# Patient Record
Sex: FEMALE | Race: WHITE | NOT HISPANIC OR LATINO
[De-identification: names, ages, dates, MRNs, and addresses within clinical notes are randomized per-mention and may not be internally consistent; named-entity substitution may affect disease eponyms.]

---

## 2022-09-22 ENCOUNTER — NON-APPOINTMENT (OUTPATIENT)
Age: 37
End: 2022-09-22

## 2022-09-22 PROBLEM — Z00.00 ENCOUNTER FOR PREVENTIVE HEALTH EXAMINATION: Status: ACTIVE | Noted: 2022-09-22

## 2022-09-23 ENCOUNTER — OUTPATIENT (OUTPATIENT)
Dept: OUTPATIENT SERVICES | Facility: HOSPITAL | Age: 37
LOS: 1 days | End: 2022-09-23
Payer: MEDICAID

## 2022-09-23 ENCOUNTER — APPOINTMENT (OUTPATIENT)
Dept: CT IMAGING | Facility: CLINIC | Age: 37
End: 2022-09-23

## 2022-09-23 ENCOUNTER — TRANSCRIPTION ENCOUNTER (OUTPATIENT)
Age: 37
End: 2022-09-23

## 2022-09-23 ENCOUNTER — NON-APPOINTMENT (OUTPATIENT)
Age: 37
End: 2022-09-23

## 2022-09-23 ENCOUNTER — APPOINTMENT (OUTPATIENT)
Dept: ORTHOPEDIC SURGERY | Facility: CLINIC | Age: 37
End: 2022-09-23

## 2022-09-23 VITALS
HEIGHT: 62 IN | DIASTOLIC BLOOD PRESSURE: 68 MMHG | WEIGHT: 293 LBS | BODY MASS INDEX: 53.92 KG/M2 | HEART RATE: 174 BPM | SYSTOLIC BLOOD PRESSURE: 106 MMHG

## 2022-09-23 DIAGNOSIS — Z01.818 ENCOUNTER FOR OTHER PREPROCEDURAL EXAMINATION: ICD-10-CM

## 2022-09-23 DIAGNOSIS — S82.851A DISPLACED TRIMALLEOLAR FRACTURE OF RIGHT LOWER LEG, INITIAL ENCOUNTER FOR CLOSED FRACTURE: ICD-10-CM

## 2022-09-23 PROCEDURE — 73700 CT LOWER EXTREMITY W/O DYE: CPT | Mod: 26,RT

## 2022-09-23 PROCEDURE — 73700 CT LOWER EXTREMITY W/O DYE: CPT

## 2022-09-23 PROCEDURE — 99204 OFFICE O/P NEW MOD 45 MIN: CPT

## 2022-09-23 RX ORDER — OXYCODONE 5 MG/1
5 TABLET ORAL
Qty: 40 | Refills: 0 | Status: ACTIVE | COMMUNITY
Start: 2022-09-23 | End: 1900-01-01

## 2022-09-23 NOTE — ADDENDUM
[FreeTextEntry1] : I, Gris Parker, acted solely as a scribe for Dr. William Babin on this date 09/23/2022.\par \par All medical record entries made by the Scribe were at my, Dr. William Babin, direction and personally dictated by me on 09/23/2022. I have reviewed the chart and agree that the record accurately reflects my personal performance of the history, physical exam, assessment and plan. I have also personally directed, reviewed, and agreed with the chart.	\par

## 2022-09-23 NOTE — PHYSICAL EXAM
[de-identified] : General: Alert and oriented x3. In no acute distress. Pleasant in nature with a normal affect. No apparent respiratory distress.\par \par Right Ankle Exam: The patient was in a protective splint for the examination. The splint was not removed however a skin check was performed in office. \par Skin: Clean, dry, intact\par Inspection: No obvious malalignment, + swelling; no lymphadenopathy\par Pulses: 2+ DP/PT pulses\par ROM: Untested due to fracture. \par Neuro: In tact to light touch throughout [de-identified] : [facility] and reviewed in the office today, 09/23/2022 , revealed: Trimalleolar fracture of the right ankle.

## 2022-09-23 NOTE — DISCUSSION/SUMMARY
[de-identified] : Today I had a lengthy discussion with the patient regarding their right ankle, trimalleolar fracture. I have addressed all the patient's concerns surrounding the pathology of their condition. XR imaging results were reviewed with the patient. At this time I would like to obtain advanced imaging of the patient's right ankle. A CT scan was ordered so I can find out more about the etiology of the patient's condition. The patient should follow up with the office after obtaining the CT scan.		\par \par A lengthy discussion was had about the surgery. All risks, benefits and alternatives to the recommended surgical procedure were discussed which include but are not limited to bleeding, infection, nerve damage, vascular damage, failure of the wound to heal, the need for further surgery, loss of limb, DVT, PE, loss of life as well as the risks associated with general anesthesia.The patient verbalized understanding and provided informed consent to move forward with surgery. \par \par In the interim, the patient will continue to remain nonweightbearing with the posterior splint and crutches. I advised the patient to utilize 325 mg of Aspirin twice daily as instructed for blood thinning purposes. A prescription was also provided for pain medications. They can also elevate their RLE above the level of the heart. The patient understood and verbally agreed to the treatment plan. All of their questions were answered and they were satisfied with the visit. The patient should call the office if they have any questions or experience worsening symptoms.

## 2022-09-23 NOTE — HISTORY OF PRESENT ILLNESS
[FreeTextEntry1] : The patient is a 38 yo female who presents with a trimalleolar right ankle fracture which she sustained after she fell in her apartment and twisted her right ankle on 9/21/22.  She went to [facility] ED where they took x-rays diagnosed her with the fracture, they set the fracture and discharged her in a splint, NWB.  Patient on aspirin 81 mg prescribed by hospital for DVT prophylaxis.  Pain scale 5/10.  She is taking Motrin 600 mg. Here to discuss possible surgery for her right ankle.  The patient is accompanied by their father. She is currently taking Prozac, Wellbutrin, Adderall, and birth control with estrogen. No other complaints.

## 2022-09-26 ENCOUNTER — RESULT CHARGE (OUTPATIENT)
Age: 37
End: 2022-09-26

## 2022-09-26 RX ORDER — OXYCODONE 5 MG/1
5 TABLET ORAL
Qty: 20 | Refills: 0 | Status: ACTIVE | COMMUNITY
Start: 2022-09-26 | End: 1900-01-01

## 2022-09-27 ENCOUNTER — NON-APPOINTMENT (OUTPATIENT)
Age: 37
End: 2022-09-27

## 2022-09-27 ENCOUNTER — APPOINTMENT (OUTPATIENT)
Dept: INTERNAL MEDICINE | Facility: CLINIC | Age: 37
End: 2022-09-27

## 2022-09-27 VITALS
DIASTOLIC BLOOD PRESSURE: 70 MMHG | BODY MASS INDEX: 53.92 KG/M2 | SYSTOLIC BLOOD PRESSURE: 124 MMHG | TEMPERATURE: 97 F | HEIGHT: 62 IN | RESPIRATION RATE: 16 BRPM | HEART RATE: 98 BPM | OXYGEN SATURATION: 98 % | WEIGHT: 293 LBS

## 2022-09-27 DIAGNOSIS — Z99.89 OBSTRUCTIVE SLEEP APNEA (ADULT) (PEDIATRIC): ICD-10-CM

## 2022-09-27 DIAGNOSIS — F32.A ANXIETY DISORDER, UNSPECIFIED: ICD-10-CM

## 2022-09-27 DIAGNOSIS — J45.909 UNSPECIFIED ASTHMA, UNCOMPLICATED: ICD-10-CM

## 2022-09-27 DIAGNOSIS — F41.9 ANXIETY DISORDER, UNSPECIFIED: ICD-10-CM

## 2022-09-27 DIAGNOSIS — G47.33 OBSTRUCTIVE SLEEP APNEA (ADULT) (PEDIATRIC): ICD-10-CM

## 2022-09-27 DIAGNOSIS — F90.9 ATTENTION-DEFICIT HYPERACTIVITY DISORDER, UNSPECIFIED TYPE: ICD-10-CM

## 2022-09-27 DIAGNOSIS — Z01.818 ENCOUNTER FOR OTHER PREPROCEDURAL EXAMINATION: ICD-10-CM

## 2022-09-27 DIAGNOSIS — K59.00 CONSTIPATION, UNSPECIFIED: ICD-10-CM

## 2022-09-27 LAB
ALBUMIN SERPL ELPH-MCNC: 3.3 G/DL
ALP BLD-CCNC: 69 U/L
ALT SERPL-CCNC: 34 U/L
ANION GAP SERPL CALC-SCNC: 12 MMOL/L
APTT BLD: 29.1 SEC
AST SERPL-CCNC: 16 U/L
BASOPHILS # BLD AUTO: 0.09 K/UL
BASOPHILS NFR BLD AUTO: 1.3 %
BILIRUB SERPL-MCNC: 0.2 MG/DL
BUN SERPL-MCNC: 12 MG/DL
CALCIUM SERPL-MCNC: 8.8 MG/DL
CHLORIDE SERPL-SCNC: 105 MMOL/L
CO2 SERPL-SCNC: 24 MMOL/L
CREAT SERPL-MCNC: 0.96 MG/DL
EGFR: 79 ML/MIN/1.73M2
EOSINOPHIL # BLD AUTO: 0.72 K/UL
EOSINOPHIL NFR BLD AUTO: 10.5 %
GLUCOSE SERPL-MCNC: 89 MG/DL
HCT VFR BLD CALC: 39.8 %
HGB BLD-MCNC: 12.7 G/DL
IMM GRANULOCYTES NFR BLD AUTO: 0.1 %
INR PPP: 0.94 RATIO
LYMPHOCYTES # BLD AUTO: 2.34 K/UL
LYMPHOCYTES NFR BLD AUTO: 34.3 %
MAN DIFF?: NORMAL
MCHC RBC-ENTMCNC: 27.9 PG
MCHC RBC-ENTMCNC: 31.9 GM/DL
MCV RBC AUTO: 87.5 FL
MONOCYTES # BLD AUTO: 0.49 K/UL
MONOCYTES NFR BLD AUTO: 7.2 %
NEUTROPHILS # BLD AUTO: 3.18 K/UL
NEUTROPHILS NFR BLD AUTO: 46.6 %
PLATELET # BLD AUTO: 249 K/UL
POTASSIUM SERPL-SCNC: 4.3 MMOL/L
PROT SERPL-MCNC: 6.6 G/DL
PT BLD: 11.1 SEC
RBC # BLD: 4.55 M/UL
RBC # FLD: 14.2 %
SARS-COV-2 N GENE NPH QL NAA+PROBE: NOT DETECTED
SODIUM SERPL-SCNC: 141 MMOL/L
WBC # FLD AUTO: 6.83 K/UL

## 2022-09-27 PROCEDURE — 93000 ELECTROCARDIOGRAM COMPLETE: CPT

## 2022-09-27 PROCEDURE — 99214 OFFICE O/P EST MOD 30 MIN: CPT | Mod: 25

## 2022-09-27 RX ORDER — BUPROPION HYDROCHLORIDE 150 MG/1
150 TABLET, EXTENDED RELEASE ORAL
Qty: 30 | Refills: 0 | Status: ACTIVE | COMMUNITY
Start: 2022-09-01

## 2022-09-27 RX ORDER — ALBUTEROL SULFATE 90 UG/1
108 (90 BASE) INHALANT RESPIRATORY (INHALATION)
Qty: 18 | Refills: 0 | Status: ACTIVE | COMMUNITY
Start: 2022-07-11

## 2022-09-27 RX ORDER — ASPIRIN 325 MG/1
325 TABLET ORAL
Refills: 0 | Status: ACTIVE | COMMUNITY
Start: 2022-09-27

## 2022-09-27 RX ORDER — IBUPROFEN 600 MG/1
600 TABLET, FILM COATED ORAL
Qty: 20 | Refills: 0 | Status: COMPLETED | COMMUNITY
Start: 2022-09-22

## 2022-09-27 RX ORDER — ALPRAZOLAM 0.5 MG/1
0.5 TABLET ORAL
Qty: 90 | Refills: 0 | Status: ACTIVE | COMMUNITY
Start: 2022-09-01

## 2022-09-27 RX ORDER — DESOGESTREL/ETHINYL ESTRADIOL AND ETHINYL ESTRADIOL 21-5 (28)
0.15-0.02/0.01 KIT ORAL
Qty: 84 | Refills: 0 | Status: ACTIVE | COMMUNITY
Start: 2022-07-11

## 2022-09-27 RX ORDER — FLUOXETINE HYDROCHLORIDE 40 MG/1
40 CAPSULE ORAL
Qty: 30 | Refills: 0 | Status: ACTIVE | COMMUNITY
Start: 2022-09-01

## 2022-09-27 RX ORDER — COVID-19 ANTIGEN TEST
KIT MISCELLANEOUS
Qty: 2 | Refills: 0 | Status: COMPLETED | COMMUNITY
Start: 2022-08-16

## 2022-09-27 RX ORDER — SODIUM FLUORIDE 1.1 G/100G
1.1 GEL, DENTIFRICE ORAL
Qty: 102 | Refills: 0 | Status: COMPLETED | COMMUNITY
Start: 2022-06-10

## 2022-09-27 RX ORDER — BECLOMETHASONE DIPROPIONATE HFA 40 UG/1
40 AEROSOL, METERED RESPIRATORY (INHALATION)
Qty: 11 | Refills: 0 | Status: ACTIVE | COMMUNITY
Start: 2022-07-11

## 2022-09-27 RX ORDER — DEXTROAMPHETAMINE SACCHARATE, AMPHETAMINE ASPARTATE, DEXTROAMPHETAMINE SULFATE AND AMPHETAMINE SULFATE 2.5; 2.5; 2.5; 2.5 MG/1; MG/1; MG/1; MG/1
10 TABLET ORAL
Qty: 45 | Refills: 0 | Status: ACTIVE | COMMUNITY
Start: 2022-09-21

## 2022-09-27 RX ORDER — DEXTROAMPHETAMINE SACCHARATE, AMPHETAMINE ASPARTATE MONOHYDRATE, DEXTROAMPHETAMINE SULFATE AND AMPHETAMINE SULFATE 7.5; 7.5; 7.5; 7.5 MG/1; MG/1; MG/1; MG/1
30 CAPSULE, EXTENDED RELEASE ORAL
Qty: 30 | Refills: 0 | Status: ACTIVE | COMMUNITY
Start: 2022-09-21

## 2022-09-27 NOTE — PHYSICAL EXAM
[Pedal Pulses Present] : the pedal pulses are present [No Edema] : there was no peripheral edema [Normal] : soft, non-tender, non-distended, no masses palpated, no HSM and normal bowel sounds [Alert and Oriented x3] : oriented to person, place, and time [de-identified] : rigtht foot in cast and ACE bandage

## 2022-09-27 NOTE — HISTORY OF PRESENT ILLNESS
[No Pertinent Cardiac History] : no history of aortic stenosis, atrial fibrillation, coronary artery disease, recent myocardial infarction, or implantable device/pacemaker [Asthma] : asthma [Sleep Apnea] : sleep apnea [No Adverse Anesthesia Reaction] : no adverse anesthesia reaction in self or family member [(Patient denies any chest pain, claudication, dyspnea on exertion, orthopnea, palpitations or syncope)] : Patient denies any chest pain, claudication, dyspnea on exertion, orthopnea, palpitations or syncope [Good (7-10 METs)] : Good (7-10 METs) [Anti-Platelet Agents: _____] : Anti-Platelet Agents: [unfilled] [COPD] : no COPD [Chronic Anticoagulation] : no chronic anticoagulation [Chronic Kidney Disease] : no chronic kidney disease [Diabetes] : no diabetes [FreeTextEntry1] : right ankle surgery  [FreeTextEntry2] : 9/28/22 [FreeTextEntry3] : Dr. Babin  [FreeTextEntry4] : Patient is a 37 year old female with PMH SHELBY on CPAP, anxiety, depression, ADHD, asthma who presents for medical clearance. Patient is going to be having right ankle surgery after she sustained an ankle fracture after a fall last week. Patient feels well today, Has been having some constipation but has been passing gas and eating and drinking normally. Otherwise denies any other acute complaints. \par \par

## 2022-09-27 NOTE — PLAN
[FreeTextEntry1] : Constipation: Likely due to recent oxy use, start Miralax BID, Senna, mag citrate ordered until patient has BM \par Asthma: continue inhalers \par SHELBY: continue CPAP \par ADHD: continue Adderall \par Anxiety and Depression: Continue Wellbutrin, Prozac.

## 2022-09-27 NOTE — ASSESSMENT
[Patient Optimized for Surgery] : Patient optimized for surgery [No Further Testing Recommended] : no further testing recommended [FreeTextEntry4] : Patient is a 37 year old female with PMH SHELBY on CPAP, anxiety, depression, ADHD, asthma who presents for medical clearance for right ankle surgery on 9/28/22 with Dr Babin at [facility]. Patient is able to perform > 4 METs without difficulty. She denies any symptoms of chest pain, palpitations, SOB, orthopnea, PND, LE edema. Her pre-op labs have been reviewed and are within normal limits, and her EKG is sinus rhythm with no ST or T wave abnormalities. She has been placed on  mg prior to surgery for DVT ppx which she will continue until the night prior to surgery, She will continue to use her CPAP machine nightly. Patient is intermediate risk for low risk procedure and has been medically optimized for planned procedure.

## 2022-09-28 ENCOUNTER — TRANSCRIPTION ENCOUNTER (OUTPATIENT)
Age: 37
End: 2022-09-28

## 2022-09-28 RX ORDER — OXYCODONE HYDROCHLORIDE 5 MG/1
5 TABLET ORAL ONCE
Refills: 0 | Status: DISCONTINUED | OUTPATIENT
Start: 2022-09-29 | End: 2022-09-29

## 2022-09-28 RX ORDER — ONDANSETRON 8 MG/1
4 TABLET, FILM COATED ORAL ONCE
Refills: 0 | Status: DISCONTINUED | OUTPATIENT
Start: 2022-09-29 | End: 2022-09-29

## 2022-09-28 RX ORDER — SODIUM CHLORIDE 9 MG/ML
1000 INJECTION, SOLUTION INTRAVENOUS
Refills: 0 | Status: DISCONTINUED | OUTPATIENT
Start: 2022-09-29 | End: 2022-09-29

## 2022-09-28 RX ORDER — FENTANYL CITRATE 50 UG/ML
50 INJECTION INTRAVENOUS
Refills: 0 | Status: DISCONTINUED | OUTPATIENT
Start: 2022-09-29 | End: 2022-09-29

## 2022-09-28 NOTE — ASU PATIENT PROFILE, ADULT - NSICDXPASTMEDICALHX_GEN_ALL_CORE_FT
PAST MEDICAL HISTORY:  ADHD     Anxiety     Asthma     Fracture, trimalleolar right    H/O sleep apnea     H/O: depression

## 2022-09-28 NOTE — ASU PATIENT PROFILE, ADULT - FALL HARM RISK - HARM RISK INTERVENTIONS

## 2022-09-28 NOTE — ASU PATIENT PROFILE, ADULT - PAIN SCALE PREFERRED, PROFILE
ARF with prerenal on steroids; pulmonary Hypertension; Anemia; Chest  tube. Bladder retention. numerical 0-10

## 2022-09-29 ENCOUNTER — TRANSCRIPTION ENCOUNTER (OUTPATIENT)
Age: 37
End: 2022-09-29

## 2022-09-29 ENCOUNTER — OUTPATIENT (OUTPATIENT)
Dept: INPATIENT UNIT | Facility: HOSPITAL | Age: 37
LOS: 1 days | Discharge: HOME | End: 2022-09-29
Payer: MEDICAID

## 2022-09-29 ENCOUNTER — APPOINTMENT (OUTPATIENT)
Dept: ORTHOPEDIC SURGERY | Facility: HOSPITAL | Age: 37
End: 2022-09-29

## 2022-09-29 VITALS
HEART RATE: 101 BPM | TEMPERATURE: 99 F | OXYGEN SATURATION: 100 % | SYSTOLIC BLOOD PRESSURE: 152 MMHG | HEIGHT: 62 IN | WEIGHT: 293 LBS | RESPIRATION RATE: 16 BRPM | DIASTOLIC BLOOD PRESSURE: 96 MMHG

## 2022-09-29 VITALS
HEART RATE: 100 BPM | SYSTOLIC BLOOD PRESSURE: 141 MMHG | DIASTOLIC BLOOD PRESSURE: 76 MMHG | RESPIRATION RATE: 16 BRPM | TEMPERATURE: 97 F | OXYGEN SATURATION: 99 %

## 2022-09-29 DIAGNOSIS — S82.851A DISPLACED TRIMALLEOLAR FRACTURE OF RIGHT LOWER LEG, INITIAL ENCOUNTER FOR CLOSED FRACTURE: ICD-10-CM

## 2022-09-29 DIAGNOSIS — Z98.890 OTHER SPECIFIED POSTPROCEDURAL STATES: Chronic | ICD-10-CM

## 2022-09-29 DIAGNOSIS — Z96.22 MYRINGOTOMY TUBE(S) STATUS: Chronic | ICD-10-CM

## 2022-09-29 LAB — HCG UR QL: NEGATIVE — SIGNIFICANT CHANGE UP

## 2022-09-29 PROCEDURE — 27822 TREATMENT OF ANKLE FRACTURE: CPT | Mod: RT

## 2022-09-29 PROCEDURE — C1713: CPT

## 2022-09-29 PROCEDURE — C1769: CPT

## 2022-09-29 PROCEDURE — 76000 FLUOROSCOPY <1 HR PHYS/QHP: CPT

## 2022-09-29 PROCEDURE — 27829 TREAT LOWER LEG JOINT: CPT | Mod: RT

## 2022-09-29 PROCEDURE — C1889: CPT

## 2022-09-29 PROCEDURE — 81025 URINE PREGNANCY TEST: CPT

## 2022-09-29 RX ORDER — ALPRAZOLAM 0.25 MG
2 TABLET ORAL
Qty: 0 | Refills: 0 | DISCHARGE

## 2022-09-29 RX ORDER — ASPIRIN/CALCIUM CARB/MAGNESIUM 324 MG
1 TABLET ORAL
Qty: 0 | Refills: 0 | DISCHARGE

## 2022-09-29 RX ORDER — ONDANSETRON 8 MG/1
1 TABLET, FILM COATED ORAL
Qty: 10 | Refills: 0
Start: 2022-09-29 | End: 2022-10-05

## 2022-09-29 RX ORDER — DEXTROAMPHETAMINE SACCHARATE, AMPHETAMINE ASPARTATE, DEXTROAMPHETAMINE SULFATE AND AMPHETAMINE SULFATE 1.875; 1.875; 1.875; 1.875 MG/1; MG/1; MG/1; MG/1
1 TABLET ORAL
Qty: 0 | Refills: 0 | DISCHARGE

## 2022-09-29 RX ORDER — BUPROPION HYDROCHLORIDE 150 MG/1
0 TABLET, EXTENDED RELEASE ORAL
Qty: 0 | Refills: 0 | DISCHARGE

## 2022-09-29 RX ORDER — ALBUTEROL 90 UG/1
1 AEROSOL, METERED ORAL
Qty: 0 | Refills: 0 | DISCHARGE

## 2022-09-29 RX ORDER — DESOGESTREL AND ETHINYL ESTRADIOL 0.15-0.03
0 KIT ORAL
Qty: 0 | Refills: 0 | DISCHARGE

## 2022-09-29 RX ORDER — OXYCODONE HYDROCHLORIDE 5 MG/1
0 TABLET ORAL
Qty: 0 | Refills: 0 | DISCHARGE

## 2022-09-29 RX ORDER — OXYCODONE HYDROCHLORIDE 5 MG/1
1 TABLET ORAL
Qty: 0 | Refills: 0 | DISCHARGE

## 2022-09-29 RX ORDER — OXYCODONE HYDROCHLORIDE 5 MG/1
1 TABLET ORAL
Qty: 20 | Refills: 0
Start: 2022-09-29 | End: 2022-10-05

## 2022-09-29 RX ORDER — DEXTROAMPHETAMINE SACCHARATE, AMPHETAMINE ASPARTATE, DEXTROAMPHETAMINE SULFATE AND AMPHETAMINE SULFATE 1.875; 1.875; 1.875; 1.875 MG/1; MG/1; MG/1; MG/1
0 TABLET ORAL
Qty: 0 | Refills: 0 | DISCHARGE

## 2022-09-29 RX ORDER — FLUOXETINE HCL 10 MG
0 CAPSULE ORAL
Qty: 0 | Refills: 0 | DISCHARGE

## 2022-09-29 RX ORDER — BECLOMETHASONE DIPROPIONATE 40 UG/1
0 AEROSOL, METERED RESPIRATORY (INHALATION)
Qty: 0 | Refills: 0 | DISCHARGE

## 2022-09-29 RX ORDER — FLUOXETINE HCL 10 MG
1 CAPSULE ORAL
Qty: 0 | Refills: 0 | DISCHARGE

## 2022-09-29 RX ORDER — ASPIRIN/CALCIUM CARB/MAGNESIUM 324 MG
1 TABLET ORAL
Qty: 60 | Refills: 1
Start: 2022-09-29 | End: 2022-11-27

## 2022-09-29 RX ORDER — NORETHINDRONE 0.35 MG/1
1 TABLET ORAL
Qty: 0 | Refills: 0 | DISCHARGE

## 2022-09-29 RX ORDER — ASPIRIN/CALCIUM CARB/MAGNESIUM 324 MG
0 TABLET ORAL
Qty: 0 | Refills: 0 | DISCHARGE

## 2022-09-29 RX ORDER — DOCUSATE SODIUM 100 MG
1 CAPSULE ORAL
Qty: 42 | Refills: 0
Start: 2022-09-29 | End: 2022-10-12

## 2022-09-29 RX ORDER — ALPRAZOLAM 0.25 MG
0 TABLET ORAL
Qty: 0 | Refills: 0 | DISCHARGE

## 2022-09-29 RX ORDER — ALBUTEROL 90 UG/1
0 AEROSOL, METERED ORAL
Qty: 0 | Refills: 0 | DISCHARGE

## 2022-09-29 NOTE — BRIEF OPERATIVE NOTE - NSICDXBRIEFPROCEDURE_GEN_ALL_CORE_FT
PROCEDURES:  OREF, fracture, ankle, bimalleolar 29-Sep-2022 12:38:57 With syndesmotic fixation and medial malleolus Husam Lee

## 2022-09-29 NOTE — ASU DISCHARGE PLAN (ADULT/PEDIATRIC) - NS MD DC FALL RISK RISK
For information on Fall & Injury Prevention, visit: [email or web address] OR  [email or web address] OR  https://www.cdc.gov/steadi/patient.html

## 2022-09-29 NOTE — ASU DISCHARGE PLAN (ADULT/PEDIATRIC) - CARE PROVIDER_API CALL
William Babin (DO)  Orthopaedic Surgery  [address] [place] [address]  Phone: [phone]  Fax: [phone]  Follow Up Time:

## 2022-09-29 NOTE — ASU PREOP CHECKLIST - IDENTIFICATION BAND VERIFIED
One of my major concerns with the patient is her continued abuse of tobacco, cigarette smoking  We told the patient with her underlying disease this is not acceptable and she needs to quit entirely period patient is considering trying some nicotine supplements especially since she is going to be visiting family in [place] in the near future    Hopefully patient will be successful in her endeavors done

## 2022-09-29 NOTE — ASU DISCHARGE PLAN (ADULT/PEDIATRIC) - ASU DC SPECIAL INSTRUCTIONSFT
Post-Operative Instructions    PRESCRIPTIONS: your post-operative medications have been handed to you or sent to the pharmacy you indicated at your pre-operative visit.  If you have any difficulty obtaining your post-operative medications or have any questions, please call the office at [phone].      PAIN MANAGEMENT: You should expect to have discomfort for the first week or so after surgery. Pain medication should be taken to help alleviate the pain so that you are comfortable and can participate in physical therapy.  Take the medication as directed.  You may decrease the amount of pain medication, as tolerated, when pain improves.  You must exercise caution when operating a motor vehicle. You have been prescribed one or more of the following as indicated on your Med Rec form thta the Nurse will go over with you:  [ X ] Oxycodone 5mg 1-2 tablets by mouth every 4 to 6 hours as needed for pain  Oxycodone is a short-acting pain medication routinely prescribed after surgery.  It is the pain medication found in “Percocet,” which is a combination of oxycodone and Tylenol.  If you were prescribed oxycodone, you may take Tylenol in addition to this medication, if needed for pain control.  [  ] Oxycontin 10mg by mouth every 12 hours for 5 days  Oxycontin is a long-acting pain medication.  It is sometimes prescribed after longer surgeries. If you were prescribed this medication, you should take it twice a day for the first 5 days after surgery to help with pain control.  [  ] Vicodin or Norco (Hydrocodone 5mg/Tylenol 325mg) 1-2 tablets by mouth every 4-6 hours as needed for pain  Vicodin and Norco are short acting pain medications sometimes prescribed after surgery.  If you were prescribed this medication, you may take 1-2 tablets every 4-6 hours as needed for pain.  This medication already contains Tylenol.  You should NOT take any additional Tylenol (acetaminophen) if you are taking these medications.    NAUSEA: Nausea is a common side effect of anesthesia and pain medications.  You may take the below medication if you are experiencing nausea after surgery.  If you continue to experience nausea or vomiting more than 24 hours after surgery, please call the office.  [ X ] Ondansetron 4mg by mouth every 4 hours as needed for nausea    CONSTIPATION: common side effect of anesthesia and pain medications.  You should take Colace three times daily, as long as you are taking narcotic pain medications after surgery, such as oxycodone, oxycontin, vicodin, or norco.  [ X ] Colace 100mg by mouth three times daily    DVT PROPHYLAXIS (PREVENTION OF BLOOD CLOTS): Aspirin EC can reduce the risk of blood clots after surgery, particularly after surgery on the legs, ankles and feet.  If you have been prescribed Aspirn, it is essential that you take this medication.  If you already are on an anticoagulant (blood thinner) such as Xarelto, Coumadin, Warfarin, Eliquis - you should resume you home "blood-thinner" in place of the Aspirn.  [ X ] Aspirin 325mg ENTERIC COATED (EC) by mouth twice daily for 4 weeks (depending on surgical procedure)    ACTIVITY: You should be up and moving as much and as often as possible! Do NOT walk or put bodyweight on your splint or surgical side. Use Crutches or walker. You must keep your bandage/splint dry. Do NOT get it wet. IF you shower it MUST be covered tightly with a garbage bag. Otherwise sponge bath is advised. You do NOT want wet bandages on your skin as they can cause skin breakdown under the splint.    BANDAGE: Your bandage will be changed in the office. Do NOT remove it yourself. IF it gets damaged or wet (soaked) call. Follow up about 7-10 days in office or as otherwise advised by Dr. Babin if different.

## 2022-10-04 DIAGNOSIS — S82.851A DISPLACED TRIMALLEOLAR FRACTURE OF RIGHT LOWER LEG, INITIAL ENCOUNTER FOR CLOSED FRACTURE: ICD-10-CM

## 2022-10-04 DIAGNOSIS — Z79.82 LONG TERM (CURRENT) USE OF ASPIRIN: ICD-10-CM
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
## 2022-10-04 DIAGNOSIS — F32.A DEPRESSION, UNSPECIFIED: ICD-10-CM

## 2022-10-04 DIAGNOSIS — Z99.89 DEPENDENCE ON OTHER ENABLING MACHINES AND DEVICES: ICD-10-CM

## 2022-10-04 DIAGNOSIS — S93.491A SPRAIN OF OTHER LIGAMENT OF RIGHT ANKLE, INITIAL ENCOUNTER: ICD-10-CM

## 2022-10-04 DIAGNOSIS — J45.909 UNSPECIFIED ASTHMA, UNCOMPLICATED: ICD-10-CM

## 2022-10-04 DIAGNOSIS — G47.33 OBSTRUCTIVE SLEEP APNEA (ADULT) (PEDIATRIC): ICD-10-CM
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
## 2022-10-04 DIAGNOSIS — F90.9 ATTENTION-DEFICIT HYPERACTIVITY DISORDER, UNSPECIFIED TYPE: ICD-10-CM

## 2022-10-04 DIAGNOSIS — F41.9 ANXIETY DISORDER, UNSPECIFIED: ICD-10-CM

## 2022-10-07 ENCOUNTER — TRANSCRIPTION ENCOUNTER (OUTPATIENT)
Age: 37
End: 2022-10-07

## 2022-10-14 ENCOUNTER — APPOINTMENT (OUTPATIENT)
Dept: ORTHOPEDIC SURGERY | Facility: CLINIC | Age: 37
End: 2022-10-14

## 2022-10-14 PROBLEM — F41.9 ANXIETY DISORDER, UNSPECIFIED: Chronic | Status: ACTIVE | Noted: 2022-09-28

## 2022-10-14 PROBLEM — S82.853A DISPLACED TRIMALLEOLAR FRACTURE OF UNSPECIFIED LOWER LEG, INITIAL ENCOUNTER FOR CLOSED FRACTURE: Chronic | Status: ACTIVE | Noted: 2022-09-28

## 2022-10-14 PROBLEM — Z86.59 PERSONAL HISTORY OF OTHER MENTAL AND BEHAVIORAL DISORDERS: Chronic | Status: ACTIVE | Noted: 2022-09-28

## 2022-10-14 PROBLEM — J45.909 UNSPECIFIED ASTHMA, UNCOMPLICATED: Chronic | Status: ACTIVE | Noted: 2022-09-28

## 2022-10-14 PROBLEM — F90.9 ATTENTION-DEFICIT HYPERACTIVITY DISORDER, UNSPECIFIED TYPE: Chronic | Status: ACTIVE | Noted: 2022-09-28

## 2022-10-14 PROBLEM — Z86.69 PERSONAL HISTORY OF OTHER DISEASES OF THE NERVOUS SYSTEM AND SENSE ORGANS: Chronic | Status: ACTIVE | Noted: 2022-09-28

## 2022-10-14 PROCEDURE — 73610 X-RAY EXAM OF ANKLE: CPT | Mod: RT

## 2022-10-14 PROCEDURE — 99024 POSTOP FOLLOW-UP VISIT: CPT

## 2022-10-14 PROCEDURE — 97760 ORTHOTIC MGMT&TRAING 1ST ENC: CPT

## 2022-10-14 NOTE — HISTORY OF PRESENT ILLNESS
[___ Weeks Post Op] : [unfilled] weeks post op [Neuro Intact] : an unremarkable neurological exam [Doing Well] : is doing well [Excellent Pain Control] : has excellent pain control [Healed] : healed [Swelling] : swollen [Vascular Intact] : ~T peripheral vascular exam normal [Negative Sudeep's] : maneuvers demonstrated a negative Sudeep's sign [No Sign of Infection] : is showing no signs of infection [Chills] : no chills [Fever] : no fever [Nausea] : no nausea [Vomiting] : no vomiting [Erythema] : not erythematous [Discharge] : absent of discharge [Dehiscence] : not dehisced [Sutures Removed] : sutures were not removed [de-identified] : s/p ORIF of the right trimalleolar fracture without posterior malleolus fixation, ORIF of the right distal tib-fib syndesmosis\par DOS: 9/29/2022 [de-identified] : 37 year old female presenting in the office 2 weeks post op from ORIF of the right trimalleolar fracture without posterior malleolus fixation, ORIF of the right distal tib-fib syndesmosis. The patient's pain is noted to be well controlled. She is concerned with pruritus to the incisional site. She does report discoloration and pain to her toes. She is on Aspirin for DVT Prophylaxis. The patient presents wearing a posterior splint and is ambulating with a wheelchair. No other complaints at this time. She presents today for a wound check. \par \par The patient is accompanied by their father. 	 [de-identified] : General: Alert and oriented x3. In no acute distress. Pleasant in nature with a normal affect. No apparent respiratory distress.\par The wound is intact. no evidence of any diastases or dehiscence. No surrounding erythema noted. No fluctuance. The area is warm and well perfused. The patient is able to wiggle their toes. Neurovascularly intact.\par \par The incisions were clean, dry, and intact. The sutures were not removed. 	 [de-identified] : 3V of the right ankle were ordered, obtained, and reviewed by me today, 10/14/2022, revealed: Hardware intact. Appropriate alignment. \par  [de-identified] : 37 year old  female presenting in the office 2 weeks post op from ORIF of the right trimalleolar fracture without posterior malleolus fixation, ORIF of the right distal tib-fib syndesmosis. [de-identified] : 37 year old female presenting in the office 2 weeks post op from ORIF of the right trimalleolar fracture without posterior malleolus fixation, ORIF of the right distal tib-fib syndesmosis.\par \par XR imaging was completed in office today and results were reviewed with the patient. I recommended that the patient utilize a CAM boot. The patient was fitted for the CAM boot in the office today. The patient was educated about the boot wear pattern and utilization, as well as the timeframe to come out of the boot. She was also given full instructions for using the boot. She should remain nonweightbearing until further evaluation. I also advised the patient to utilize 325 mg of Aspirin as instructed for blood thinning purposes. I recommend that the patient utilize ice PRN. They can also elevate their ankle above the level of the heart.		\par \par I have addressed all the patient's concerns surrounding the pathology of their condition. The patient understood and verbally agreed to the treatment plan. All of their questions were answered and they were satisfied with the visit. The patient should call the office if they have any questions or experience worsening symptoms.\par \par Follow up in 1 week for re-evaluation and suture removal.

## 2022-10-14 NOTE — ADDENDUM
[FreeTextEntry1] : I, Gris Parker, acted solely as a scribe for Dr. William Babin on this date 10/14/2022.\par \par All medical record entries made by the Scribe were at my, Dr. William Babin, direction and personally dictated by me on 10/14/2022. I have reviewed the chart and agree that the record accurately reflects my personal performance of the history, physical exam, assessment and plan. I have also personally directed, reviewed, and agreed with the chart.	\par

## 2022-10-17 ENCOUNTER — TRANSCRIPTION ENCOUNTER (OUTPATIENT)
Age: 37
End: 2022-10-17

## 2022-10-21 ENCOUNTER — APPOINTMENT (OUTPATIENT)
Dept: ORTHOPEDIC SURGERY | Facility: CLINIC | Age: 37
End: 2022-10-21

## 2022-10-21 PROCEDURE — 99024 POSTOP FOLLOW-UP VISIT: CPT

## 2022-10-21 RX ORDER — OXYCODONE 5 MG/1
5 TABLET ORAL
Qty: 28 | Refills: 0 | Status: ACTIVE | COMMUNITY
Start: 2022-10-06 | End: 1900-01-01

## 2022-10-21 NOTE — HISTORY OF PRESENT ILLNESS
[___ Weeks Post Op] : [unfilled] weeks post op [Clean/Dry/Intact] : clean, dry and intact [Healed] : healed [Swelling] : swollen [Neuro Intact] : an unremarkable neurological exam [Vascular Intact] : ~T peripheral vascular exam normal [Negative Sudeep's] : maneuvers demonstrated a negative Sudeep's sign [Doing Well] : is doing well [Excellent Pain Control] : has excellent pain control [No Sign of Infection] : is showing no signs of infection [Chills] : no chills [Fever] : no fever [Nausea] : no nausea [Vomiting] : no vomiting [Erythema] : not erythematous [Discharge] : absent of discharge [Dehiscence] : not dehisced [Sutures Removed] : sutures were removed [de-identified] : s/p ORIF of the right trimalleolar fracture without posterior malleolus fixation, ORIF of the right distal tib-fib syndesmosis\par DOS: 9/29/2022 [de-identified] : 37 year old female presenting in the office 3 weeks post op from ORIF of the right trimalleolar fracture without posterior malleolus fixation, ORIF of the right distal tib-fib syndesmosis. The patient's pain is noted to be well controlled. She is on Lovenox daily for DVT Prophylaxis.  The patient presents wearing her long cam boot in a wheelchair, nonweightbearing.  The patient denies fevers, chills, night's, shortness of breath, calf pain.\par \par The patient is accompanied by their father. 	 [de-identified] : No imaging performed.\par  [de-identified] : General: Alert and oriented x3. In no acute distress. Pleasant in nature with a normal affect. No apparent respiratory distress.\par The wound is intact. no evidence of any diastases or dehiscence. No surrounding erythema noted. No fluctuance. The area is warm and well perfused. The patient is able to wiggle their toes. Neurovascularly intact.\par \par The incisions were clean, dry, and intact. The sutures were removed. 	 [de-identified] : 37 year old  female presenting in the office 3 weeks post op from ORIF of the right trimalleolar fracture without posterior malleolus fixation, ORIF of the right distal tib-fib syndesmosis. [de-identified] : 37 year old female presenting in the office 3 weeks post op from ORIF of the right trimalleolar fracture without posterior malleolus fixation, ORIF of the right distal tib-fib syndesmosis.\par \par At this time the sutures were removed.  The patient will remain nonweightbearing for approximately 2 more weeks.  I want her to start physical therapy in 2 weeks and they will transition her.  She will continue with Lovenox daily for DVT prophylaxis for 2 more weeks and after she is completed the Lovenox, she will go on aspirin for an additional 2 weeks for DVT prophylaxis.  I want to see the patient back here with Dr. Babin in 3 weeks.  At that visit new x-rays will be taken.  If anything changes she should call the office or come in.

## 2022-10-24 ENCOUNTER — TRANSCRIPTION ENCOUNTER (OUTPATIENT)
Age: 37
End: 2022-10-24

## 2022-10-24 RX ORDER — ENOXAPARIN SODIUM 40 MG/.4ML
40 INJECTION, SOLUTION SUBCUTANEOUS
Qty: 14 | Refills: 0 | Status: ACTIVE | COMMUNITY
Start: 2022-09-30 | End: 1900-01-01

## 2022-11-01 ENCOUNTER — TRANSCRIPTION ENCOUNTER (OUTPATIENT)
Age: 37
End: 2022-11-01

## 2022-11-11 ENCOUNTER — APPOINTMENT (OUTPATIENT)
Dept: ORTHOPEDIC SURGERY | Facility: CLINIC | Age: 37
End: 2022-11-11

## 2022-11-11 PROCEDURE — 99024 POSTOP FOLLOW-UP VISIT: CPT

## 2022-11-11 PROCEDURE — 73610 X-RAY EXAM OF ANKLE: CPT | Mod: RT

## 2022-11-11 NOTE — ADDENDUM
[FreeTextEntry1] : I, Gris Parker, acted solely as a scribe for Dr. William Babin on this date 11/11/2022.\par \par All medical record entries made by the Scribe were at my, Dr. William Babin, direction and personally dictated by me on 11/11/2022. I have reviewed the chart and agree that the record accurately reflects my personal performance of the history, physical exam, assessment and plan. I have also personally directed, reviewed, and agreed with the chart.	\par

## 2022-11-11 NOTE — HISTORY OF PRESENT ILLNESS
[___ Weeks Post Op] : [unfilled] weeks post op [Clean/Dry/Intact] : clean, dry and intact [Healed] : healed [Swelling] : swollen [Neuro Intact] : an unremarkable neurological exam [Vascular Intact] : ~T peripheral vascular exam normal [Negative Sudeep's] : maneuvers demonstrated a negative Sudeep's sign [Doing Well] : is doing well [Excellent Pain Control] : has excellent pain control [No Sign of Infection] : is showing no signs of infection [Sutures Removed] : sutures were removed [Chills] : no chills [Fever] : no fever [Nausea] : no nausea [Vomiting] : no vomiting [Erythema] : not erythematous [Discharge] : absent of discharge [Dehiscence] : not dehisced [de-identified] : s/p ORIF of the right trimalleolar fracture without posterior malleolus fixation, ORIF of the right distal tib-fib syndesmosis\par DOS: 9/29/2022 [de-identified] : 37 year old female presenting in the office 6 weeks post op from ORIF of the right trimalleolar fracture without posterior malleolus fixation, ORIF of the right distal tib-fib syndesmosis. The patient's pain is noted to be well controlled. She is on blood thinners for DVT Prophylaxis.  The patient has been attending physical therapy for this issue. The patient presents wearing her long cam boot and is ambulating with a walker. The patient denies fevers, chills, night's, shortness of breath, calf pain.\par \par The patient is accompanied by their father. 	 [de-identified] : General: Alert and oriented x3. In no acute distress. Pleasant in nature with a normal affect. No apparent respiratory distress.\par The wound is intact. no evidence of any diastases or dehiscence. No surrounding erythema noted. No fluctuance. The area is warm and well perfused. The patient is able to wiggle their toes. Neurovascularly intact.\par \par The incisions were clean, dry, and intact. The sutures were removed. \par \par ROM:\par 1. Dorsiflexion: 10 degrees\par 2. Plantarflexion: 40 degrees\par 3. Inversion: 20 degrees\par 4. Eversion: 20 degrees	 [de-identified] : 3V of the right ankle were ordered, obtained, and reviewed by me today, 11/11/2022, revealed: Hardware intact. Appropriate alignment. \par \par  [de-identified] : 37 year old  female presenting in the office 6 weeks post op from ORIF of the right trimalleolar fracture without posterior malleolus fixation, ORIF of the right distal tib-fib syndesmosis. [de-identified] : 37 year old  female presenting in the office 6 weeks post op from ORIF of the right trimalleolar fracture without posterior malleolus fixation, ORIF of the right distal tib-fib syndesmosis.\par \par I recommend the patient undergo a course of physical therapy for the right ankle  2-3 times a week for a total of 8-12 weeks. A prescription was given for the physical therapy today. I recommended that the patient begin to transition out of the CAM boot to an ASO brace after the next 2-3 weeks. The ASO brace was given today. Recommended to the patient to utilize ice PRN for swelling control. \par \par I have addressed all the patient's concerns surrounding the pathology of their condition. The patient understood and verbally agreed to the treatment plan. All of their questions were answered and they were satisfied with the visit. The patient should call the office if they have any questions or experience worsening symptoms.\par \par Follow up in 4-6 weeks for re-evaluation.

## 2022-12-23 ENCOUNTER — APPOINTMENT (OUTPATIENT)
Dept: ORTHOPEDIC SURGERY | Facility: CLINIC | Age: 37
End: 2022-12-23
Payer: MEDICAID

## 2022-12-23 PROCEDURE — 99024 POSTOP FOLLOW-UP VISIT: CPT

## 2022-12-23 PROCEDURE — 73610 X-RAY EXAM OF ANKLE: CPT | Mod: RT

## 2022-12-23 NOTE — HISTORY OF PRESENT ILLNESS
[___ Months Post Op] : [unfilled] months post op [Clean/Dry/Intact] : clean, dry and intact [Healed] : healed [Swelling] : swollen [Neuro Intact] : an unremarkable neurological exam [Vascular Intact] : ~T peripheral vascular exam normal [Negative Sudeep's] : maneuvers demonstrated a negative Sudeep's sign [Doing Well] : is doing well [Excellent Pain Control] : has excellent pain control [No Sign of Infection] : is showing no signs of infection [Sutures Removed] : sutures were removed [Chills] : no chills [Fever] : no fever [Nausea] : no nausea [Vomiting] : no vomiting [Erythema] : not erythematous [Discharge] : absent of discharge [Dehiscence] : not dehisced [de-identified] : s/p ORIF of the right trimalleolar fracture without posterior malleolus fixation, ORIF of the right distal tib-fib syndesmosis\par DOS: 9/29/2022 [de-identified] : 37 year old female presenting in the office almost 3 months post op from ORIF of the right trimalleolar fracture without posterior malleolus fixation, ORIF of the right distal tib-fib syndesmosis. The patient reports concerns of achiness to the posterolateral ankle, worsened by the end of the day. The patient has been attending physical therapy/HEP for this issue. The patient presents wearing an ASO brace and is ambulating with a cane. She does report that she wears the CAM boot when walking in the city. She reports concerns of balance, reporting that she feels that she may fall with the cane. The patient does report concerns of numbness to the top of her foot that has not improved since the DOS. The patient denies fevers, chills, night's, shortness of breath, calf pain.\par \par The patient is accompanied by their father. 	 [de-identified] : General: Alert and oriented x3. In no acute distress. Pleasant in nature with a normal affect. No apparent respiratory distress.\par The wound is intact. no evidence of any diastases or dehiscence. No surrounding erythema noted. No fluctuance. The area is warm and well perfused. The patient is able to wiggle their toes. Neurovascularly intact.\par \par The incisions were clean, dry, and intact. The sutures were removed. Improved swelling. \par \par Right fourth webspace pain. \par ROM:\par 1. Dorsiflexion: 10 degrees\par 2. Plantarflexion: 40 degrees\par 3. Inversion: 20 degrees\par 4. Eversion: 20 degrees	 [de-identified] : 3V of the right ankle were ordered, obtained, and reviewed by me today, 12/23/2022, revealed: Hardware intact. Appropriate alignment.  [de-identified] : 37 year old  female presenting in the office almost 3 months post op from ORIF of the right trimalleolar fracture without posterior malleolus fixation, ORIF of the right distal tib-fib syndesmosis. [de-identified] : 37 year old  female presenting in the office almost 3 months post op from ORIF of the right trimalleolar fracture without posterior malleolus fixation, ORIF of the right distal tib-fib syndesmosis.\par \par XR imaging was completed in office today and I reviewed the results with the patient. I recommend the patient undergo a course of physical therapy for the right ankle  2-3 times a week for a total of 8-12 weeks. A prescription was given for the physical therapy today. Recommended to the patient to wean out of the CAM boot to an ASO brace while in the city. Hold off on advanced imaging at this time. \par \par I have addressed all the patient's concerns surrounding the pathology of their condition. The patient understood and verbally agreed to the treatment plan. All of their questions were answered and they were satisfied with the visit. The patient should call the office if they have any questions or experience worsening symptoms.\par \par Follow up in 2-3 months for re-evaluation.

## 2022-12-23 NOTE — ADDENDUM
Alecia calling to find out the status of the prior auth that was sent in on August 11 and three times since. Patient is waiting at the pharmacy to  the [facility]. Please call to discuss and advise.     [FreeTextEntry1] : I, Gris Parker, acted solely as a scribe for Dr. William Babin on this date 12/23/2022.\par \par All medical record entries made by the Scribe were at my, Dr. William Babin, direction and personally dictated by me on 12/23/2022. I have reviewed the chart and agree that the record accurately reflects my personal performance of the history, physical exam, assessment and plan. I have also personally directed, reviewed, and agreed with the chart.	\par

## 2023-03-31 ENCOUNTER — APPOINTMENT (OUTPATIENT)
Dept: ORTHOPEDIC SURGERY | Facility: CLINIC | Age: 38
End: 2023-03-31
Payer: MEDICAID

## 2023-03-31 DIAGNOSIS — S82.851A DISPLACED TRIMALLEOLAR FRACTURE OF RIGHT LOWER LEG, INITIAL ENCOUNTER FOR CLOSED FRACTURE: ICD-10-CM

## 2023-03-31 PROCEDURE — 99213 OFFICE O/P EST LOW 20 MIN: CPT

## 2023-03-31 PROCEDURE — 73610 X-RAY EXAM OF ANKLE: CPT | Mod: RT

## 2023-03-31 NOTE — ADDENDUM
[FreeTextEntry1] : I, ANYA EWING, acted solely as a scribe for Dr. William Babin on this date 03/31/2023  .\par  \par All medical record entries made by the Scribe were at my, Dr. William Babin, direction and personally dictated by me on 03/31/2023 . I have reviewed the chart and agree that the record accurately reflects my personal performance of the history, physical exam, assessment and plan. I have also personally directed, reviewed, and agreed with the chart.

## 2023-03-31 NOTE — DISCUSSION/SUMMARY
[de-identified] : Today I had a lengthy discussion with the patient regarding their right ankle injury. I have addressed all the patient's concerns surrounding the pathology of their condition. XR imaging was obtained and reviewed in detail with patient today in office. I recommend the patient undergo a course of physical therapy for the right ankle  2-3 times a week for a total of 6-8 weeks. A prescription was given for the physical therapy today. I recommend that the patient utilize ice, NSAIDS PRN, and heat. They can also elevate their right ankle above the level of the heart. \par \par I recommend the patient follow up PRN to reassess their condition. \par \par The patient understood and verbally agreed to the treatment plan. All of their questions were answered and they were satisfied with the visit. The patient should call the office if they have any questions or experience worsening symptoms.

## 2023-03-31 NOTE — HISTORY OF PRESENT ILLNESS
[FreeTextEntry1] : THANH RAMOS is a 38 year old female who presents for follow up evaluation of right ankle s/p ORIF trimalleolar. Patient reports symptoms are improving and she is currently increasing activity. The patient has no other complaints at this time.

## 2023-03-31 NOTE — PHYSICAL EXAM
[de-identified] : General: Alert and oriented x3. In no acute distress. Pleasant in nature with a normal affect. No apparent respiratory distress.\par Right Ankle Exam\par Skin: Clean, dry, intact\par Inspection: No obvious malalignment, no swelling, no effusion; no lymphadenopathy\par Pulses: 2+ DP/PT pulses\par ROM: Right Ankle 10 degrees of dorsiflexion, 40 degrees of plantarflexion, 10 degrees of subtalar motion\par Tenderness: No tenderness over the lateral malleolus, no CFL/ATFL/PTFL pain. No medial malleolus pain, no deltoid ligament pain. No proximal fibular pain. No heel pain.\par Stability: Negative anterior/posterior drawer.\par Strength: 5/5 TA/GS/EHL\par Neuro: In tact to light touch throughout\par Additional tests: Negative Mortons test, Negative syndesmosis squeeze test.  [de-identified] : 3V of the right ankle were ordered obtained and reviewed by me today, 03/31/2023 , revealed: Healing trimalleolar fracture, hardware intact, good alignment

## 2023-06-30 ENCOUNTER — APPOINTMENT (OUTPATIENT)
Dept: INTERNAL MEDICINE | Facility: CLINIC | Age: 38
End: 2023-06-30

## 2024-02-12 ENCOUNTER — APPOINTMENT (OUTPATIENT)
Dept: INTERNAL MEDICINE | Facility: CLINIC | Age: 39
End: 2024-02-12

## 2024-05-04 NOTE — ASU DISCHARGE PLAN (ADULT/PEDIATRIC) - FOR NEXT 24 HOURS DO NOT:
[facility]  Office: [phone]  Willis Sutherland DO, Edu Sood DO, Jett Ramirez DO, Grzegorz Sterling DO, Tri Croft MD, Melodie Hatfield MD, Aleyda Lopez MD, Ailyn Diggs MD,  Robe Clarke MD, Juani Hinojosa MD, Addi Mauro DO, Mana Day MD,  Esteban Wolf DO, Joe Pinedo MD, Yaw Torres MD, Carter Sutherland DO, Tonja Medina MD,  Noman Tovar DO, Nina Bates MD, Nai Ching MD, Candy Novak MD, Karol Patel MD,  Pj Jacobs MD, Charo Beltran MD, Ena Roth MD, Jasen Seth DO, Margo Whitmore MD,  Raymon Brito MD, Shirley Waterhouse, CNP,  Carmina Cummins, CNP, Namrata Beltran, CNP, Vineet Murry, CNP,  Christianne Chan, DNP, Swathi Paiz, CNP, Reanna Tolbert, CNP, Kelsea Huang, CNP, Lydia Esquivel, CNP, Arabella Dick, CNP, Chad Gaston PA-C, Linda Hernandez, CNS, Luann Pretty, CNP, Kendy Perez, CNP         [facility]   IN-PATIENT SERVICE   [facility]    Progress Note    5/4/2024    1:51 PM    Name:   Valerie Young  MRN:     [id number]     Acct:      [id number]   Room:   327/327-01   Day:  2  Admit Date:  5/2/2024  3:06 PM    PCP:   No primary care provider on file.  Code Status:  Full Code    Subjective:     Patient seen and examined this morning.  Resting comfortably in her bed.  Stated that her diarrhea has improved.  Denied any nausea, vomiting, abdominal pain.  No acute events overnight.  Lab work reviewed, lipase trended down to 63.  No leukocytosis.  C. difficile and GI panel negative.  GI on board.  She is tolerating diet well.    Medications:     Allergies:  No Known Allergies    Current Meds:   Scheduled Meds:    ciprofloxacin  400 mg IntraVENous Q12H    metroNIDAZOLE  500 mg IntraVENous Q8H    sodium chloride  80 mL IntraVENous Once    sodium chloride flush  10 mL IntraVENous BID    fluticasone  1 spray Nasal Daily    sodium chloride flush  5-40 mL IntraVENous 2 times per day    enoxaparin  30 mg SubCUTAneous  Daily    famotidine (PEPCID) injection  20 mg IntraVENous Daily    azithromycin  500 mg Oral Q24H    lisinopril  20 mg Oral Daily    And    hydroCHLOROthiazide  25 mg Oral Daily     Continuous Infusions:    sodium chloride 100 mL/hr at 24 0253    sodium chloride       PRN Meds: ipratropium 0.5 mg-albuterol 2.5 mg, zolpidem, sodium chloride flush, sodium chloride, ondansetron **OR** ondansetron, magnesium hydroxide, acetaminophen **OR** acetaminophen, albuterol    Data:     Vitals:  BP (!) 165/67   Pulse 86   Temp 98.2 °F (36.8 °C) (Oral)   Resp 14   Ht 1.575 m (5' 2\")   Wt 56.5 kg (124 lb 9 oz)   SpO2 98%   BMI 22.78 kg/m²   Temp (24hrs), Av °F (36.7 °C), Min:97.3 °F (36.3 °C), Max:98.6 °F (37 °C)    No results for input(s): \"POCGLU\" in the last 72 hours.    I/O (24Hr):    Intake/Output Summary (Last 24 hours) at 2024 1351  Last data filed at 5/3/2024 1801  Gross per 24 hour   Intake 19.63 ml   Output --   Net 19.63 ml       Labs:  Hematology:  Recent Labs     24  1550 24  0306 24  1508 24  1008   WBC 16.3* 14.1*  --  10.3   RBC 3.62* 3.04*  --  3.03*   HGB 11.9* 10.1*  --  9.8*   HCT 34.8* 29.4*  --  29.4*   MCV 96.0 96.8  --  97.1   MCH 32.9 33.4  --  32.4   MCHC 34.3 34.5  --  33.4   RDW 15.0 15.1  --  15.1   * 603*  --  647*   MPV 5.9* 5.8*  --  6.0   SEDRATE  --   --  42*  --    CRP  --   --  38.2*  --      Chemistry:  Recent Labs     24  1550 24  2118 24  0306 24  0845 24   *   < > 129*   < > 131* 131* 133*   K 4.1  --  4.1  --   --   --   --    CL 84*  --  97*  --   --   --   --    CO2 19*  --  19*  --   --   --   --    GLUCOSE 114*  --  100*  --   --   --   --    BUN 31*  --  27*  --   --   --   --    CREATININE 1.6*  --  1.3*  --   --   --   --    MG 1.7  --   --   --   --   --   --    ANIONGAP 19*  --  13  --   --   --   --    LABGLOM 32*  --  41*  --   --   --   --    CALCIUM 10.0   Statement Selected